# Patient Record
Sex: MALE | Race: WHITE | Employment: FULL TIME | URBAN - METROPOLITAN AREA
[De-identification: names, ages, dates, MRNs, and addresses within clinical notes are randomized per-mention and may not be internally consistent; named-entity substitution may affect disease eponyms.]

---

## 2021-09-06 ENCOUNTER — HOSPITAL ENCOUNTER (EMERGENCY)
Age: 56
Discharge: HOME | End: 2021-09-06
Attending: EMERGENCY MEDICINE
Payer: COMMERCIAL

## 2021-09-06 VITALS
HEART RATE: 100 BPM | DIASTOLIC BLOOD PRESSURE: 87 MMHG | TEMPERATURE: 98.3 F | HEIGHT: 69 IN | OXYGEN SATURATION: 98 % | SYSTOLIC BLOOD PRESSURE: 142 MMHG | BODY MASS INDEX: 32.72 KG/M2 | WEIGHT: 220.9 LBS | RESPIRATION RATE: 16 BRPM

## 2021-09-06 DIAGNOSIS — R09.81 NASAL CONGESTION: Primary | ICD-10-CM

## 2021-09-06 DIAGNOSIS — R43.0 LOSS OF SENSE OF SMELL: ICD-10-CM

## 2021-09-06 DIAGNOSIS — Z20.822 PERSON UNDER INVESTIGATION FOR COVID-19: ICD-10-CM

## 2021-09-06 LAB — SARS-COV-2, COV2: NORMAL

## 2021-09-06 PROCEDURE — 99282 EMERGENCY DEPT VISIT SF MDM: CPT

## 2021-09-06 PROCEDURE — U0005 INFEC AGEN DETEC AMPLI PROBE: HCPCS

## 2021-09-06 RX ORDER — FLUTICASONE PROPIONATE 50 MCG
2 SPRAY, SUSPENSION (ML) NASAL DAILY
Qty: 1 EACH | Refills: 0 | Status: SHIPPED | OUTPATIENT
Start: 2021-09-06

## 2021-09-06 RX ORDER — CETIRIZINE HCL 10 MG
10 TABLET ORAL DAILY
Qty: 20 TABLET | Refills: 0 | Status: SHIPPED | OUTPATIENT
Start: 2021-09-06

## 2021-09-07 LAB
SARS-COV-2, XPLCVT: DETECTED
SOURCE, COVRS: ABNORMAL

## 2021-09-07 NOTE — ED PROVIDER NOTES
EMERGENCY DEPARTMENT HISTORY AND PHYSICAL EXAM      Date: 9/6/2021  Patient Name: Teto Castillo    History of Presenting Illness     Chief Complaint   Patient presents with    Concern For UHIEQ-63 (Coronavirus)     Pt vaccinated for COVID and states today he lost his sense of smell. History Provided By: Patient    HPI: Teto Castillo, 55 y.o. male presents ambulatory to the ED with cc of concerned about COVID-19 because he lost his sense of smell today. He tells me he \"feels fine\" but feels like he has a \"head cold\" with nasal congestion. He tells me he is vaccinated against COVID-19 this season having received the [employer] COVID-19 vaccine a couple months ago. There are no known sick contacts and there has been no recent travel. He denies chest pain or shortness of breath. It was when he shared his symptoms with his wife she insisted he come to the hospital and be tested for COVID-19. There are no other complaints, changes, or physical findings at this time. PCP: None      Past History     Past Medical History:  No past medical history on file. Past Surgical History:  No past surgical history on file. Family History:  No family history on file. Social History:  Social History     Tobacco Use    Smoking status: Not on file   Substance Use Topics    Alcohol use: Not on file    Drug use: Not on file       Allergies: Allergies   Allergen Reactions    Codeine Other (comments)     Hallucinations       Review of Systems   Review of Systems   Constitutional: Negative for fatigue and fever. HENT: Positive for congestion (And loss of sense of smell). Negative for ear pain and rhinorrhea. Eyes: Negative for pain and redness. Respiratory: Negative for cough and wheezing. Cardiovascular: Negative for chest pain and palpitations. Gastrointestinal: Negative for abdominal pain, nausea and vomiting. Genitourinary: Negative for dysuria, frequency and urgency.    Musculoskeletal: Negative for back pain, neck pain and neck stiffness. Skin: Negative for rash and wound. Neurological: Negative for weakness, light-headedness, numbness and headaches. Physical Exam   Physical Exam  Vitals and nursing note reviewed. Constitutional:       General: He is not in acute distress. Appearance: He is well-developed. He is not toxic-appearing. HENT:      Head: Normocephalic and atraumatic. No right periorbital erythema or left periorbital erythema. Jaw: No trismus. Right Ear: External ear normal.      Left Ear: External ear normal.      Nose: Nose normal.   Eyes:      General: No scleral icterus. Conjunctiva/sclera: Conjunctivae normal.      Pupils: Pupils are equal, round, and reactive to light. Cardiovascular:      Rate and Rhythm: Normal rate. Pulmonary:      Effort: Pulmonary effort is normal. No tachypnea, accessory muscle usage or respiratory distress. Abdominal:      General: There is no distension. Palpations: Abdomen is not rigid. Musculoskeletal:         General: Normal range of motion. Cervical back: Normal range of motion. Skin:     Findings: No rash. Neurological:      Mental Status: He is alert and oriented to person, place, and time. He is not disoriented. GCS: GCS eye subscore is 4. GCS verbal subscore is 5. GCS motor subscore is 6. Cranial Nerves: No cranial nerve deficit. Sensory: No sensory deficit. Psychiatric:         Speech: Speech normal.       Diagnostic Study Results     Labs -     Recent Results (from the past 12 hour(s))   SARS-COV-2    Collection Time: 09/06/21  9:27 PM   Result Value Ref Range    SARS-CoV-2 Please find results under separate order         Radiologic Studies -   No orders to display     CT Results  (Last 48 hours)    None        CXR Results  (Last 48 hours)    None        Medical Decision Making   I am the first provider for this patient.     I reviewed the vital signs, available nursing notes, past medical history, past surgical history, family history and social history. Vital Signs-Reviewed the patient's vital signs. Patient Vitals for the past 12 hrs:   Temp Pulse Resp BP SpO2   09/06/21 2101 98.3 °F (36.8 °C) 100 16 (!) 142/87 98 %       Pulse Oximetry Analysis - 98% on RA    Records Reviewed: Nursing Notes    Provider Notes (Medical Decision Making):   Patient is afebrile and well-appearing. He tells me he is vaccinated against Covid this season, having received his [address] vaccine a couple months ago. He tells me he has had a \"head cold\" for couple of days, but lost his sense of smell today. There are no known sick contacts and there has been no recent travel. He denies chest pain or shortness of breath, and indeed, tells me he \"feels fine\". He presents with concern about COVID-19. Believe reasonable to obtain a discharge SARS-CoV-2 swab. Patient is instructed to use the OB10 danielle in order to have the test results most quickly. Return precautions for worsening symptoms or any concerns. ED Course:   Initial assessment performed. The patients presenting problems have been discussed, and they are in agreement with the care plan formulated and outlined with them. I have encouraged them to ask questions as they arise throughout their visit. Disposition:  Discharge    PLAN:  1. Discharge Medication List as of 9/6/2021  9:38 PM      START taking these medications    Details   cetirizine (ZyrTEC) 10 mg tablet Take 1 Tablet by mouth daily. , Normal, Disp-20 Tablet, R-0      fluticasone propionate (FLONASE) 50 mcg/actuation nasal spray 2 Sprays by Both Nostrils route daily. , Normal, Disp-1 Each, R-0           2.    Follow-up Information     Follow up With Specialties Details Why Contact Info    [facility]  Call  PRIMARY CARE: as needed [address] [facility] [address]   [address] [phone]        Return to ED if worse     Diagnosis     Clinical Impression:   1. Nasal congestion    2. Loss of sense of smell    3.  Person under investigation for COVID-19

## 2021-09-08 ENCOUNTER — PATIENT OUTREACH (OUTPATIENT)
Dept: CASE MANAGEMENT | Age: 56
End: 2021-09-08

## 2021-09-08 NOTE — PROGRESS NOTES
RE Positive Covid: Called patient. Verified demographics. Informed of positive COVID. Questions answered. Return precautions provided. Patient is fully vaccinated.

## 2021-09-08 NOTE — PROGRESS NOTES
Patient contacted regarding COVID-19 diagnosis. [facility] ED 21 dx-nasal congestion, loss of sense of smell, PUI for covid-19 (concern for covid-19)    Discussed COVID-19 related testing which was available at this time. Test results were positive. Patient informed of results, if available? yes. Pt stated he is feeling fine, just still having some nasal congestion, which flonase has really  Helped. He is not taking the zyrtec, but is taking daquil, vit c and vit d. Pt is quarantined in his camper in his yard. Pt does not have a PCP, uses [facility] since he moved to [place] from [address] [place] [address]. Pt was provided PCP info in My Chart. Ambulatory Care Manager contacted the patient by telephone to perform post discharge assessment. Call within 2 business days of discharge: Yes Verified name and  with patient as identifiers. Provided introduction to self, and explanation of the CTN/ACM role, and reason for call due to risk factors for infection and/or exposure to COVID-19. Symptoms reviewed with patient who verbalized the following symptoms: loss or taste or smell, no new symptoms and no worsening symptoms      Due to no new or worsening symptoms encounter was not routed to provider for escalation. Discussed follow-up appointments. If no appointment was previously scheduled, appointment scheduling offered:  yes. [facility] follow up appointment(s): No future appointments. Non-[facility] follow up appointment(s): unknown    Interventions to address risk factors: Scheduled appointment with PCP-sent PCP info in My Chart message to pt, Obtained and reviewed discharge summary and/or continuity of care documents, Education of patient/family/caregiver/guardian to support self-management-ACP and Assessment and support for treatment adherence and medication management-flonase, zyrtec     Advance Care Planning:   Does patient have an Advance Directive: not on file; education provided.      Educated patient about risk for severe COVID-19 due to risk factors according to CDC guidelines. ACM reviewed discharge instructions, medical action plan and red flag symptoms with the patient who verbalized understanding. Discussed COVID vaccination status: yes. Education provided on COVID-19 vaccination as appropriate. Discussed exposure protocols and quarantine with CDC Guidelines. Patient was given an opportunity to verbalize any questions and concerns and agrees to contact ACM or health care provider for questions related to their healthcare. Reviewed and educated patient on any new and changed medications related to discharge diagnosis     Was patient discharged with a pulse oximeter? no Discussed and confirmed pulse oximeter discharge instructions and when to notify provider or seek emergency care. ACM provided contact information. Plan for follow-up call in 5-7 days based on severity of symptoms and risk factors.

## 2021-09-22 ENCOUNTER — PATIENT OUTREACH (OUTPATIENT)
Dept: CASE MANAGEMENT | Age: 56
End: 2021-09-22

## 2021-09-22 NOTE — PROGRESS NOTES
Patient resolved from Transition of Care episode on 9/22/21 - follow up-  [facility] ED 9/6/21 dx-nasal congestion, loss of sense of smell, PUI for covid-19 (concern for covid-19). ACM/CTN was unsuccessful at contacting this patient today. Patient/family was provided the following resources and education related to COVID-19 during the initial call:                         Signs, symptoms and red flags related to COVID-19            CDC exposure and quarantine guidelines            [facility] exposure contact - [phone]            Contact for their local Department of Health                 Patient has not had any additional ED or hospital visits. No further outreach scheduled with this CTN/ACM. Episode of Care resolved. Patient has this CTN/ACM contact information if future needs arise.

## 2023-05-15 RX ORDER — FLUTICASONE PROPIONATE 50 MCG
2 SPRAY, SUSPENSION (ML) NASAL DAILY
COMMUNITY
Start: 2021-09-06

## 2023-05-15 RX ORDER — CETIRIZINE HYDROCHLORIDE 10 MG/1
10 TABLET ORAL DAILY
COMMUNITY
Start: 2021-09-06